# Patient Record
Sex: FEMALE | Race: WHITE | NOT HISPANIC OR LATINO | Employment: OTHER | URBAN - METROPOLITAN AREA
[De-identification: names, ages, dates, MRNs, and addresses within clinical notes are randomized per-mention and may not be internally consistent; named-entity substitution may affect disease eponyms.]

---

## 2017-04-18 DIAGNOSIS — I50.9 CONGESTIVE HEART FAILURE, UNSPECIFIED CONGESTIVE HEART FAILURE CHRONICITY, UNSPECIFIED CONGESTIVE HEART FAILURE TYPE: ICD-10-CM

## 2017-04-18 RX ORDER — CARVEDILOL 12.5 MG/1
12.5 TABLET ORAL 2 TIMES DAILY WITH MEALS
Qty: 180 TAB | Refills: 0 | Status: SHIPPED | OUTPATIENT
Start: 2017-04-18 | End: 2017-06-26 | Stop reason: SDUPTHER

## 2017-04-24 ENCOUNTER — TELEPHONE (OUTPATIENT)
Dept: CARDIOLOGY | Facility: MEDICAL CENTER | Age: 62
End: 2017-04-24

## 2017-04-24 NOTE — TELEPHONE ENCOUNTER
Fax from [facility] reporting 2 active Rxs for carvedilol 12.5 mg.  Pt was last seen in clinic 3/29/16 at which time Dr. Maurice > her Carvedilol to 12.5 mg BID.  She canceled her appointments for 4/12/16, 4/26/16 & 5/20/16.  She has no scheduled follow up appointment.  Apparently she has a prescription from Dr. Marrero for Carvedilol 12.5 mg 0.5 tablet BID on 3/14/17.  Faxed back to [facility] with information we have not seen patient and cannot address Dr. Marrero's Rx's.  Sent to [facility]

## 2017-04-26 NOTE — TELEPHONE ENCOUNTER
Repeat fax request received from [facility] pharmacy.  Faxed what was sent 4/24/17.  As stated previously regarding Carvedilol dose.

## 2017-06-26 DIAGNOSIS — I50.22 CHRONIC SYSTOLIC CHF (CONGESTIVE HEART FAILURE) (HCC): ICD-10-CM

## 2017-06-27 RX ORDER — CARVEDILOL 12.5 MG/1
12.5 TABLET ORAL 2 TIMES DAILY WITH MEALS
Qty: 60 TAB | Refills: 0 | Status: SHIPPED | OUTPATIENT
Start: 2017-06-27 | End: 2021-03-10

## 2017-07-12 ENCOUNTER — HOSPITAL ENCOUNTER (OUTPATIENT)
Dept: RADIOLOGY | Facility: MEDICAL CENTER | Age: 62
End: 2017-07-12
Attending: NURSE PRACTITIONER
Payer: MEDICARE

## 2017-07-12 ENCOUNTER — HOSPITAL ENCOUNTER (EMERGENCY)
Facility: MEDICAL CENTER | Age: 62
End: 2017-07-12
Attending: EMERGENCY MEDICINE
Payer: MEDICARE

## 2017-07-12 VITALS
DIASTOLIC BLOOD PRESSURE: 50 MMHG | SYSTOLIC BLOOD PRESSURE: 78 MMHG | HEIGHT: 66 IN | RESPIRATION RATE: 16 BRPM | WEIGHT: 125.22 LBS | BODY MASS INDEX: 20.12 KG/M2 | HEART RATE: 57 BPM | OXYGEN SATURATION: 97 % | TEMPERATURE: 98.7 F

## 2017-07-12 DIAGNOSIS — S90.31XA CONTUSION OF RIGHT FOOT, INITIAL ENCOUNTER: ICD-10-CM

## 2017-07-12 DIAGNOSIS — M79.671 RIGHT FOOT PAIN: ICD-10-CM

## 2017-07-12 PROCEDURE — 99283 EMERGENCY DEPT VISIT LOW MDM: CPT

## 2017-07-12 PROCEDURE — 304561 HCHG STAT O2

## 2017-07-12 PROCEDURE — A9270 NON-COVERED ITEM OR SERVICE: HCPCS | Performed by: EMERGENCY MEDICINE

## 2017-07-12 PROCEDURE — 700102 HCHG RX REV CODE 250 W/ 637 OVERRIDE(OP): Performed by: EMERGENCY MEDICINE

## 2017-07-12 RX ORDER — IBUPROFEN 600 MG/1
600 TABLET ORAL ONCE
Status: COMPLETED | OUTPATIENT
Start: 2017-07-12 | End: 2017-07-12

## 2017-07-12 RX ORDER — IBUPROFEN 600 MG/1
600 TABLET ORAL EVERY 6 HOURS PRN
Qty: 20 TAB | Refills: 0 | Status: ON HOLD | OUTPATIENT
Start: 2017-07-12 | End: 2023-01-12

## 2017-07-12 RX ADMIN — IBUPROFEN 600 MG: 600 TABLET, FILM COATED ORAL at 12:12

## 2017-07-12 ASSESSMENT — PAIN SCALES - GENERAL: PAINLEVEL_OUTOF10: 9

## 2017-07-12 NOTE — ED AVS SNAPSHOT
7/12/2017    Asya Rollins  [address]  [place] [address]    Dear Asya:    [facility] wants to ensure your discharge home is safe and you or your loved ones have had all of your questions answered regarding your care after you leave the hospital.    Below is a list of resources and contact information should you have any questions regarding your hospital stay, follow-up instructions, or active medical symptoms.    Questions or Concerns Regarding… Contact   Medical Questions Related to Your Discharge  (7 days a week, 8am-5pm) Contact a Nurse Care Coordinator   [phone]   Medical Questions Not Related to Your Discharge  (24 hours a day / 7 days a week)  Contact the Nurse Health Line   [phone]    Medications or Discharge Instructions Refer to your discharge packet   or contact your [facility] Primary Care Provider   [phone]   Follow-up Appointment(s) Schedule your appointment via [employer]   or contact Scheduling [phone]   Billing Review your statement via [employer]  or contact Billing [phone]   Medical Records Review your records via [employer]   or contact Medical Records [phone]     You may receive a telephone call within two days of discharge. This call is to make certain you understand your discharge instructions and have the opportunity to have any questions answered. You can also easily access your medical information, test results and upcoming appointments via the [employer] free online health management tool. You can learn more and sign up at [employer]. For assistance setting up your [employer] account, please call [phone].    Once again, we want to ensure your discharge home is safe and that you have a clear understanding of any next steps in your care. If you have any questions or concerns, please do not hesitate to contact us, we are here for you. Thank you for choosing [facility] for your healthcare needs.    Sincerely,    Your [facility] Healthcare Team

## 2017-07-12 NOTE — ED NOTES
"Attempted to d/c-pt states dr has not \"given me the results\". rn stated foot bone buised but not broke. Pt wishes to speak with erp prior to d/c. erp aware of same  "

## 2017-07-12 NOTE — ED NOTES
Dc instructions given to pt after erp. Prescription to be picked up at pharmacy in [facility]. Pt aware of same. States did not bring oxygen to er, despite wearing oxygen in er. No one able to bring her some, and did not wear it to hospital. encourgaed to NOT go anywhere w/o same. amb with crutches out of er

## 2017-07-12 NOTE — ED AVS SNAPSHOT
Rescale Access Code: [email or web address]  Expires: 7/21/2017  3:08 PM    Rescale  A secure, online tool to manage your health information     [employer]’s Rescale® is a secure, online tool that connects you to your personalized health information from the privacy of your home -- day or night - making it very easy for you to manage your healthcare. Once the activation process is completed, you can even access your medical information using the Rescale domingo, which is available for free in the Apple Domingo store or Google Play store.     Rescale provides the following levels of access (as shown below):   My Chart Features   [facility] Primary Care Doctor [facility]  Specialists [facility]  Urgent  Care Non-[facility]  Primary Care  Doctor   Email your healthcare team securely and privately 24/7 X X X X   Manage appointments: schedule your next appointment; view details of past/upcoming appointments X      Request prescription refills. X      View recent personal medical records, including lab and immunizations X X X X   View health record, including health history, allergies, medications X X X X   Read reports about your outpatient visits, procedures, consult and ER notes X X X X   See your discharge summary, which is a recap of your hospital and/or ER visit that includes your diagnosis, lab results, and care plan. X X       How to register for Rescale:  1. Go to  [email or web address].  2. Click on the Sign Up Now box, which takes you to the New Member Sign Up page. You will need to provide the following information:  a. Enter your Rescale Access Code exactly as it appears at the top of this page. (You will not need to use this code after you’ve completed the sign-up process. If you do not sign up before the expiration date, you must request a new code.)   b. Enter your date of birth.   c. Enter your home email address.   d. Click Submit, and follow the next screen’s instructions.  3. Create a Rescale ID. This will be your Rescale  login ID and cannot be changed, so think of one that is secure and easy to remember.  4. Create a [email or web address] password. You can change your password at any time.  5. Enter your Password Reset Question and Answer. This can be used at a later time if you forget your password.   6. Enter your e-mail address. This allows you to receive e-mail notifications when new information is available in [email or web address].  7. Click Sign Up. You can now view your health information.    For assistance activating your [email or web address] account, call [phone]

## 2017-07-12 NOTE — ED AVS SNAPSHOT
Home Care Instructions                                                                                                                Asya Rollins   MRN: [id number]    Department:  [facility], Emergency Dept   Date of Visit:  7/12/2017            [facility], Emergency Dept    [address]    [place] [address]    Phone:  [phone]      You were seen by     Ash Freeman M.D.      Your Diagnosis Was     Contusion of right foot, initial encounter     S90.31XA       These are the medications you received during your hospitalization from 07/12/2017 1129 to 07/12/2017 1343     Date/Time Order Dose Route Action    07/12/2017 1212 ibuprofen (MOTRIN) tablet 600 mg 600 mg Oral Given      Follow-up Information     1. Follow up with [facility], Emergency Dept.    Specialty:  Emergency Medicine    Why:  If symptoms worsen    Contact information    [address] [address] [phone]        2. Follow up with ROSALIE Barakat. Schedule an appointment as soon as possible for a visit in 1 week.    Specialty:  Family Medicine    Why:  if pain persists    Contact information    [address]  [place] [address] [phone]        Medication Information     Review all of your home medications and newly ordered medications with your primary doctor and/or pharmacist as soon as possible. Follow medication instructions as directed by your doctor and/or pharmacist.     Please keep your complete medication list with you and share with your physician. Update the information when medications are discontinued, doses are changed, or new medications (including over-the-counter products) are added; and carry medication information at all times in the event of emergency situations.               Medication List      START taking these medications        Instructions    Morning Afternoon Evening Bedtime    ibuprofen  600 MG Tabs   Last time this was given:  600 mg on 7/12/2017 12:12 PM   Commonly known as:  MOTRIN        Take 1 Tab by mouth every 6 hours as needed.   Dose:  600 mg                          ASK your doctor about these medications        Instructions    Morning Afternoon Evening Bedtime    ambrisentan 5 MG tablet   Commonly known as:  LETAIRIS        Take 5 mg by mouth every day.   Dose:  5 mg                        Aspirin 325 MG Tbec        Take 1 Tab by mouth every day.   Dose:  325 mg                        atorvastatin 10 MG Tabs   Commonly known as:  LIPITOR        Take 1 Tab by mouth every bedtime.   Dose:  10 mg                        BREO ELLIPTA 100-25 MCG/INH Aepb   Generic drug:  Fluticasone Furoate-Vilanterol        Inhale 1 Puff by mouth every day.   Dose:  1 Puff                        busPIRone 10 MG Tabs   Commonly known as:  BUSPAR        Doctor's comments:  Must keep upcoming appointment   Take 1 Tab by mouth 2 times a day.   Dose:  10 mg                        carvedilol 12.5 MG Tabs   Commonly known as:  COREG        Take 1 Tab by mouth 2 times a day, with meals. Please see your cardiologist for refills   Dose:  12.5 mg                        escitalopram 10 MG Tabs   Commonly known as:  LEXAPRO        Take 1 Tab by mouth every day.   Dose:  10 mg                        fluticasone-salmeterol 250-50 MCG/DOSE Aepb   Commonly known as:  ADVAIR        Inhale 1 Puff by mouth every 12 hours.   Dose:  1 Puff                        furosemide 20 MG Tabs   Commonly known as:  LASIX        Take 1 Tab by mouth 2 times a day.   Dose:  20 mg                        * Levothyroxine Sodium 50 MCG Caps   Commonly known as:  TIROSINT        Doctor's comments:  Must get further refills from PCP   Take 1 Cap by mouth Every morning on an empty stomach.   Dose:  1 Cap                        * levothyroxine 50 MCG Tabs   Commonly known as:  SYNTHROID        Take 1 Tab by mouth Every morning on an empty stomach.      Dose:  50 mcg                        lisinopril 5 MG Tabs   Commonly known as:  PRINIVIL        Take 5 mg by mouth every day.   Dose:  5 mg                        ondansetron 4 MG Tbdp   Commonly known as:  ZOFRAN ODT        Take 1-2 Tabs by mouth every 8 hours as needed for Nausea/Vomiting.   Dose:  4-8 mg                        * pregabalin 50 MG capsule   Commonly known as:  LYRICA        Take 1 Cap by mouth 3 times a day.   Dose:  50 mg                        * pregabalin 100 MG Caps   Commonly known as:  LYRICA        Take 1 Cap by mouth 2 times a day.   Dose:  100 mg                        sildenafil 20 MG tablet   Commonly known as:  REVATIO        Take 20 mg by mouth 3 times a day.   Dose:  20 mg                        spironolactone 25 MG Tabs   Commonly known as:  ALDACTONE        Take 25 mg by mouth. 1/2 tablet daily   Dose:  25 mg                        TADALAFIL PO        Take  by mouth.                        VENTOLIN  (90 BASE) MCG/ACT Aers inhalation aerosol   Generic drug:  albuterol        INHALE TWO PUFFS BY MOUTH EVERY 4 TO 6 HOURS AS NEEDED                        Vitamin D-3 5000 UNITS Tabs        Take  by mouth.                        * Notice:  This list has 4 medication(s) that are the same as other medications prescribed for you. Read the directions carefully, and ask your doctor or other care provider to review them with you.         Where to Get Your Medications      These medications were sent to [facility][id number] - [place], [place] - [address]  [address], [facility] [place] [address]     Phone:  [phone]    - ibuprofen 600 MG Tabs            Procedures and tests performed during your visit     CRUTCHES        Discharge Instructions       Foot Contusion  A foot contusion is a deep bruise to the foot. Contusions are the result of an injury that caused bleeding under the skin. The contusion may turn blue, purple, or yellow. Minor injuries will give you a painless  contusion, but more severe contusions may stay painful and swollen for a few weeks.  CAUSES   A foot contusion comes from a direct blow to that area, such as a heavy object falling on the foot.  SYMPTOMS   · Swelling of the foot.  · Discoloration of the foot.  · Tenderness or soreness of the foot.  DIAGNOSIS   You will have a physical exam and will be asked about your history. You may need an X-ray of your foot to look for a broken bone (fracture).   TREATMENT   An elastic wrap may be recommended to support your foot. Resting, elevating, and applying cold compresses to your foot are often the best treatments for a foot contusion. Over-the-counter medicines may also be recommended for pain control.  HOME CARE INSTRUCTIONS   · Put ice on the injured area.  ¨ Put ice in a plastic bag.  ¨ Place a towel between your skin and the bag.  ¨ Leave the ice on for 15-20 minutes, 03-04 times a day.  · Only take over-the-counter or prescription medicines for pain, discomfort, or fever as directed by your caregiver.  · If told, use an elastic wrap as directed. This can help reduce swelling. You may remove the wrap for sleeping, showering, and bathing. If your toes become numb, cold, or blue, take the wrap off and reapply it more loosely.  · Elevate your foot with pillows to reduce swelling.  · Try to avoid standing or walking while the foot is painful. Do not resume use until instructed by your caregiver. Then, begin use gradually. If pain develops, decrease use. Gradually increase activities that do not cause discomfort until you have normal use of your foot.  · See your caregiver as directed. It is very important to keep all follow-up appointments in order to avoid any lasting problems with your foot, including long-term (chronic) pain.  SEEK IMMEDIATE MEDICAL CARE IF:   · You have increased redness, swelling, or pain in your foot.  · Your swelling or pain is not relieved with medicines.  · You have loss of feeling in your foot  or are unable to move your toes.  · Your foot turns cold or blue.  · You have pain when you move your toes.  · Your foot becomes warm to the touch.  · Your contusion does not improve in 2 days.  MAKE SURE YOU:   · Understand these instructions.  · Will watch your condition.  · Will get help right away if you are not doing well or get worse.     This information is not intended to replace advice given to you by your health care provider. Make sure you discuss any questions you have with your health care provider.     Document Released: 10/09/2007 Document Revised: 06/18/2013 Document Reviewed: 11/20/2012  [employer] Interactive Patient Education ©2016 [employer]            Patient Information     Patient Information    Following emergency treatment: all patient requiring follow-up care must return either to a private physician or a clinic if your condition worsens before you are able to obtain further medical attention, please return to the emergency room.     Billing Information    At [facility], we work to make the billing process streamlined for our patients.  Our Representatives are here to answer any questions you may have regarding your hospital bill.  If you have insurance coverage and have supplied your insurance information to us, we will submit a claim to your insurer on your behalf.  Should you have any questions regarding your bill, we can be reached online or by phone as follows:  Online: You are able pay your bills online or live chat with our representatives about any billing questions you may have. We are here to help Monday - Friday from 8:00am to 7:30pm and 9:00am - 12:00pm on Saturdays.  Please visit [email or web address]  for more information.   Phone:  [phone] or [phone]    Please note that your emergency physician, surgeon, pathologist, radiologist, anesthesiologist, and other specialists are not employed by [facility] and will therefore bill  separately for their services.  Please contact them directly for any questions concerning their bills at the numbers below:     Emergency Physician Services:  [phone]  [facility]:  [phone]  [facility]:  [phone]  [facility]:  [phone]    1. Your final bill may vary from the amount quoted upon discharge if all procedures are not complete at that time, or if your doctor has additional procedures of which we are not aware. You will receive an additional bill if you return to the Emergency Department at [facility] for suture removal regardless of the facility of which the sutures were placed.     2. Please arrange for settlement of this account at the emergency registration.    3. All self-pay accounts are due in full at the time of treatment.  If you are unable to meet this obligation then payment is expected within 4-5 days.     4. If you have had radiology studies (CT, X-ray, Ultrasound, MRI), you have received a preliminary result during your emergency department visit. Please contact the radiology department [phone] to receive a copy of your final result. Please discuss the Final result with your primary physician or with the follow up physician provided.     Crisis Hotline:  [place] Crisis Hotline:  4-035-NILLNUL or 1-580.913.5529  [place] Crisis Hotline:    1-829.874.5781 or 953-563-5814         ED Discharge Follow Up Questions    1. In order to provide you with very good care, we would like to follow up with a phone call in the next few days.  May we have your permission to contact you?     YES /  NO    2. What is the best phone number to call you? (       )_____-__________    3. What is the best time to call you?      Morning  /  Afternoon  /  Evening                   Patient Signature:  ____________________________________________________________    Date:  ____________________________________________________________

## 2017-07-12 NOTE — DISCHARGE INSTRUCTIONS
Foot Contusion  A foot contusion is a deep bruise to the foot. Contusions are the result of an injury that caused bleeding under the skin. The contusion may turn blue, purple, or yellow. Minor injuries will give you a painless contusion, but more severe contusions may stay painful and swollen for a few weeks.  CAUSES   A foot contusion comes from a direct blow to that area, such as a heavy object falling on the foot.  SYMPTOMS   · Swelling of the foot.  · Discoloration of the foot.  · Tenderness or soreness of the foot.  DIAGNOSIS   You will have a physical exam and will be asked about your history. You may need an X-ray of your foot to look for a broken bone (fracture).   TREATMENT   An elastic wrap may be recommended to support your foot. Resting, elevating, and applying cold compresses to your foot are often the best treatments for a foot contusion. Over-the-counter medicines may also be recommended for pain control.  HOME CARE INSTRUCTIONS   · Put ice on the injured area.  ¨ Put ice in a plastic bag.  ¨ Place a towel between your skin and the bag.  ¨ Leave the ice on for 15-20 minutes, 03-04 times a day.  · Only take over-the-counter or prescription medicines for pain, discomfort, or fever as directed by your caregiver.  · If told, use an elastic wrap as directed. This can help reduce swelling. You may remove the wrap for sleeping, showering, and bathing. If your toes become numb, cold, or blue, take the wrap off and reapply it more loosely.  · Elevate your foot with pillows to reduce swelling.  · Try to avoid standing or walking while the foot is painful. Do not resume use until instructed by your caregiver. Then, begin use gradually. If pain develops, decrease use. Gradually increase activities that do not cause discomfort until you have normal use of your foot.  · See your caregiver as directed. It is very important to keep all follow-up appointments in order to avoid any lasting problems with your foot,  including long-term (chronic) pain.  SEEK IMMEDIATE MEDICAL CARE IF:   · You have increased redness, swelling, or pain in your foot.  · Your swelling or pain is not relieved with medicines.  · You have loss of feeling in your foot or are unable to move your toes.  · Your foot turns cold or blue.  · You have pain when you move your toes.  · Your foot becomes warm to the touch.  · Your contusion does not improve in 2 days.  MAKE SURE YOU:   · Understand these instructions.  · Will watch your condition.  · Will get help right away if you are not doing well or get worse.     This information is not intended to replace advice given to you by your health care provider. Make sure you discuss any questions you have with your health care provider.     Document Released: 10/09/2007 Document Revised: 06/18/2013 Document Reviewed: 11/20/2012  [employer] Interactive Patient Education ©2016 [employer]

## 2017-07-12 NOTE — ED PROVIDER NOTES
ED Provider Note    CHIEF COMPLAINT   Chief Complaint   Patient presents with   • Foot Pain     dropped hammer on right foot one week ago         HPI   Asya Rollins is a 62 y.o. female who presents to the emergency department with chief complaint of right foot pain. Proximal line 5 days ago the patient dropped hammer on her foot. She says it was a normal-size hammer that fell directly on the dorsum of the foot. Since then she's had swelling of the foot. She thought that it was getting better but now it seems to be worse. She does not have any pain in the calf or leg. No other injuries. No fevers.    REVIEW OF SYSTEMS   See HPI for further details. All other systems are negative.     PAST MEDICAL HISTORY   Past Medical History   Diagnosis Date   • Hypertension    • Thyroid disease    • Other abnormal glucose    • Other malaise and fatigue    • Unspecified asthma    • Pneumonia    • Congestive heart failure (CMS-HCC)      from drugs and alcohol   • ETOH abuse    • Methamphetamine use      last 7 mos ago   • Chronic obstruct airways disease (CMS-HCC)        FAMILY HISTORY  Family History   Problem Relation Age of Onset   • Diabetes Mother    • Hypertension Mother    • Heart Disease Mother    • Thyroid Mother    • Heart Disease Father    • Diabetes Other    • Diabetes Other    • Hypertension Other    • Heart Disease Other        SOCIAL HISTORY  Social History     Social History   • Marital Status:      Spouse Name: N/A   • Number of Children: N/A   • Years of Education: N/A     Social History Main Topics   • Smoking status: Never Smoker    • Smokeless tobacco: Never Used   • Alcohol Use: Yes      Comment: daily 12 pack beerdaily   • Drug Use: Yes     Special: Marijuana      Comment: previously   • Sexual Activity: Not Asked     Other Topics Concern   • None     Social History Narrative       SURGICAL HISTORY  Past Surgical History   Procedure Laterality Date   • Other       bilat arm fx surgeries   • Gyn  "surgery       tubal ligation       CURRENT MEDICATIONS   Home Medications     **Home medications have not yet been reviewed for this encounter**          ALLERGIES   Allergies   Allergen Reactions   • Morphine Rash     Pt felt like body was on fire    • Avelox [Moxifloxacin Hcl In Nacl] Hives, Rash and Swelling       PHYSICAL EXAM  VITAL SIGNS: BP 84/50 mmHg  Pulse 56  Temp(Src) 36.9 °C (98.4 °F)  Resp 20  Ht 1.676 m (5' 6\")  Wt 56.8 kg (125 lb 3.5 oz)  BMI 20.22 kg/m2  Constitutional: Well developed, Well nourished, No acute distress, Non-toxic appearance.   Cardiovascular: The foot is warm. Normal perfusion. Strong dorsalis pedis pulse.  Skin: Warm, Dry, No erythema, No rash.   Extremities: Intact distal pulses, No cyanosis, No clubbing.   Musculoskeletal: Tenderness to palpation over the dorsum of the right foot primarily over the midfoot. No significant ecchymosis.  Neurologic: Alert & oriented x 3, Normal motor function, Normal sensory function, No focal deficits noted.     RADIOLOGY/PROCEDURES  Question capsular avulsion fracture at the navicular cuneiform joint. More likely this is secondary to dystrophic calcification from remote soft tissue injury. MRI could clarify if clinically indicated    COURSE & MEDICAL DECISION MAKING  Pertinent Labs & Imaging studies reviewed. (See chart for details)    The patient presents today after dropping a hammer on her foot. X-ray does not demonstrate any definite fracture. There is question of a capsular avulsion fracture. However an avulsion fracture would not be consistent with the patient's mechanism. This was from dropping a hammer directly on the foot which would not result in a avulsion type fracture. Therefore I do not feel that further imaging is indicated. I'll treat the patient with rice therapy. She is given crutches, she is to minimize weightbearing, rest, ice, elevation. Motrin for pain. She is discharged home in stable condition.    FINAL IMPRESSION  1. " Contusion of right foot, initial encounter            Electronically signed by: Ash Freeman, 7/12/2017 1:10 PM

## 2017-11-01 ENCOUNTER — APPOINTMENT (OUTPATIENT)
Dept: RADIOLOGY | Facility: MEDICAL CENTER | Age: 62
End: 2017-11-01
Attending: EMERGENCY MEDICINE
Payer: MEDICARE

## 2017-11-01 ENCOUNTER — HOSPITAL ENCOUNTER (EMERGENCY)
Facility: MEDICAL CENTER | Age: 62
End: 2017-11-01
Attending: EMERGENCY MEDICINE
Payer: MEDICARE

## 2017-11-01 VITALS
BODY MASS INDEX: 20.02 KG/M2 | RESPIRATION RATE: 18 BRPM | SYSTOLIC BLOOD PRESSURE: 118 MMHG | TEMPERATURE: 99.3 F | HEART RATE: 89 BPM | HEIGHT: 66 IN | DIASTOLIC BLOOD PRESSURE: 62 MMHG | OXYGEN SATURATION: 99 % | WEIGHT: 124.56 LBS

## 2017-11-01 DIAGNOSIS — M79.672 LEFT FOOT PAIN: ICD-10-CM

## 2017-11-01 DIAGNOSIS — L03.116 CELLULITIS OF LEFT LOWER EXTREMITY: ICD-10-CM

## 2017-11-01 LAB
ALBUMIN SERPL BCP-MCNC: 3.9 G/DL (ref 3.2–4.9)
ALBUMIN/GLOB SERPL: 1.1 G/DL
ALP SERPL-CCNC: 61 U/L (ref 30–99)
ALT SERPL-CCNC: 19 U/L (ref 2–50)
ANION GAP SERPL CALC-SCNC: 10 MMOL/L (ref 0–11.9)
AST SERPL-CCNC: 24 U/L (ref 12–45)
BASOPHILS # BLD AUTO: 0.4 % (ref 0–1.8)
BASOPHILS # BLD: 0.04 K/UL (ref 0–0.12)
BILIRUB SERPL-MCNC: 1.1 MG/DL (ref 0.1–1.5)
BNP SERPL-MCNC: 34 PG/ML (ref 0–100)
BUN SERPL-MCNC: 15 MG/DL (ref 8–22)
CALCIUM SERPL-MCNC: 9.1 MG/DL (ref 8.4–10.2)
CHLORIDE SERPL-SCNC: 98 MMOL/L (ref 96–112)
CO2 SERPL-SCNC: 26 MMOL/L (ref 20–33)
CREAT SERPL-MCNC: 0.78 MG/DL (ref 0.5–1.4)
EKG IMPRESSION: NORMAL
EOSINOPHIL # BLD AUTO: 0.18 K/UL (ref 0–0.51)
EOSINOPHIL NFR BLD: 2 % (ref 0–6.9)
ERYTHROCYTE [DISTWIDTH] IN BLOOD BY AUTOMATED COUNT: 48.8 FL (ref 35.9–50)
GFR SERPL CREATININE-BSD FRML MDRD: >60 ML/MIN/1.73 M 2
GLOBULIN SER CALC-MCNC: 3.5 G/DL (ref 1.9–3.5)
GLUCOSE SERPL-MCNC: 93 MG/DL (ref 65–99)
HCT VFR BLD AUTO: 40.3 % (ref 37–47)
HGB BLD-MCNC: 13.6 G/DL (ref 12–16)
IMM GRANULOCYTES # BLD AUTO: 0.03 K/UL (ref 0–0.11)
IMM GRANULOCYTES NFR BLD AUTO: 0.3 % (ref 0–0.9)
LYMPHOCYTES # BLD AUTO: 0.85 K/UL (ref 1–4.8)
LYMPHOCYTES NFR BLD: 9.4 % (ref 22–41)
MCH RBC QN AUTO: 30.9 PG (ref 27–33)
MCHC RBC AUTO-ENTMCNC: 33.7 G/DL (ref 33.6–35)
MCV RBC AUTO: 91.6 FL (ref 81.4–97.8)
MONOCYTES # BLD AUTO: 0.91 K/UL (ref 0–0.85)
MONOCYTES NFR BLD AUTO: 10.1 % (ref 0–13.4)
NEUTROPHILS # BLD AUTO: 7.01 K/UL (ref 2–7.15)
NEUTROPHILS NFR BLD: 77.8 % (ref 44–72)
NRBC # BLD AUTO: 0 K/UL
NRBC BLD AUTO-RTO: 0 /100 WBC
PLATELET # BLD AUTO: 224 K/UL (ref 164–446)
PMV BLD AUTO: 10.3 FL (ref 9–12.9)
POTASSIUM SERPL-SCNC: 3.4 MMOL/L (ref 3.6–5.5)
PROT SERPL-MCNC: 7.4 G/DL (ref 6–8.2)
RBC # BLD AUTO: 4.4 M/UL (ref 4.2–5.4)
SODIUM SERPL-SCNC: 134 MMOL/L (ref 135–145)
TROPONIN I SERPL-MCNC: <0.02 NG/ML (ref 0–0.04)
URATE SERPL-MCNC: 6.9 MG/DL (ref 1.9–8.2)
WBC # BLD AUTO: 9 K/UL (ref 4.8–10.8)

## 2017-11-01 PROCEDURE — 36415 COLL VENOUS BLD VENIPUNCTURE: CPT

## 2017-11-01 PROCEDURE — 85025 COMPLETE CBC W/AUTO DIFF WBC: CPT

## 2017-11-01 PROCEDURE — 84550 ASSAY OF BLOOD/URIC ACID: CPT

## 2017-11-01 PROCEDURE — 99285 EMERGENCY DEPT VISIT HI MDM: CPT

## 2017-11-01 PROCEDURE — A9270 NON-COVERED ITEM OR SERVICE: HCPCS | Performed by: EMERGENCY MEDICINE

## 2017-11-01 PROCEDURE — 700102 HCHG RX REV CODE 250 W/ 637 OVERRIDE(OP): Performed by: EMERGENCY MEDICINE

## 2017-11-01 PROCEDURE — 93005 ELECTROCARDIOGRAM TRACING: CPT

## 2017-11-01 PROCEDURE — 304561 HCHG STAT O2

## 2017-11-01 PROCEDURE — 93005 ELECTROCARDIOGRAM TRACING: CPT | Performed by: EMERGENCY MEDICINE

## 2017-11-01 PROCEDURE — 700111 HCHG RX REV CODE 636 W/ 250 OVERRIDE (IP): Performed by: EMERGENCY MEDICINE

## 2017-11-01 PROCEDURE — 84484 ASSAY OF TROPONIN QUANT: CPT

## 2017-11-01 PROCEDURE — 96374 THER/PROPH/DIAG INJ IV PUSH: CPT

## 2017-11-01 PROCEDURE — 80053 COMPREHEN METABOLIC PANEL: CPT

## 2017-11-01 PROCEDURE — 96375 TX/PRO/DX INJ NEW DRUG ADDON: CPT

## 2017-11-01 PROCEDURE — 83880 ASSAY OF NATRIURETIC PEPTIDE: CPT

## 2017-11-01 PROCEDURE — 73630 X-RAY EXAM OF FOOT: CPT | Mod: LT

## 2017-11-01 PROCEDURE — 73630 X-RAY EXAM OF FOOT: CPT | Mod: RT

## 2017-11-01 PROCEDURE — 94760 N-INVAS EAR/PLS OXIMETRY 1: CPT

## 2017-11-01 PROCEDURE — 71010 DX-CHEST-PORTABLE (1 VIEW): CPT

## 2017-11-01 RX ORDER — SPIRONOLACTONE 25 MG/1
25 TABLET ORAL DAILY
COMMUNITY

## 2017-11-01 RX ORDER — FUROSEMIDE 20 MG/1
20 TABLET ORAL
Status: ON HOLD | COMMUNITY
End: 2023-01-12

## 2017-11-01 RX ORDER — HYDROCODONE BITARTRATE AND ACETAMINOPHEN 5; 325 MG/1; MG/1
1-2 TABLET ORAL EVERY 6 HOURS PRN
Qty: 10 TAB | Refills: 0 | Status: SHIPPED | OUTPATIENT
Start: 2017-11-01 | End: 2019-02-18

## 2017-11-01 RX ORDER — CEPHALEXIN 250 MG/1
500 CAPSULE ORAL ONCE
Status: COMPLETED | OUTPATIENT
Start: 2017-11-01 | End: 2017-11-01

## 2017-11-01 RX ORDER — ONDANSETRON 2 MG/ML
4 INJECTION INTRAMUSCULAR; INTRAVENOUS ONCE
Status: COMPLETED | OUTPATIENT
Start: 2017-11-01 | End: 2017-11-01

## 2017-11-01 RX ORDER — BUSPIRONE HYDROCHLORIDE 10 MG/1
10 TABLET ORAL PRN
Status: SHIPPED | COMMUNITY
End: 2018-01-03 | Stop reason: SDUPTHER

## 2017-11-01 RX ORDER — CEPHALEXIN 500 MG/1
500 CAPSULE ORAL 4 TIMES DAILY
Qty: 28 CAP | Refills: 0 | Status: SHIPPED | OUTPATIENT
Start: 2017-11-01 | End: 2017-11-08

## 2017-11-01 RX ORDER — SULFAMETHOXAZOLE AND TRIMETHOPRIM 800; 160 MG/1; MG/1
1 TABLET ORAL 2 TIMES DAILY
Qty: 14 TAB | Refills: 0 | Status: SHIPPED | OUTPATIENT
Start: 2017-11-01 | End: 2017-11-08

## 2017-11-01 RX ORDER — SULFAMETHOXAZOLE AND TRIMETHOPRIM 800; 160 MG/1; MG/1
1 TABLET ORAL ONCE
Status: COMPLETED | OUTPATIENT
Start: 2017-11-01 | End: 2017-11-01

## 2017-11-01 RX ADMIN — ONDANSETRON 4 MG: 2 INJECTION, SOLUTION INTRAMUSCULAR; INTRAVENOUS at 16:38

## 2017-11-01 RX ADMIN — SULFAMETHOXAZOLE AND TRIMETHOPRIM 1 TABLET: 800; 160 TABLET ORAL at 17:58

## 2017-11-01 RX ADMIN — FENTANYL CITRATE 50 MCG: 50 INJECTION INTRAMUSCULAR; INTRAVENOUS at 16:38

## 2017-11-01 RX ADMIN — CEPHALEXIN 500 MG: 250 CAPSULE ORAL at 17:58

## 2017-11-01 ASSESSMENT — PAIN SCALES - GENERAL
PAINLEVEL_OUTOF10: 0
PAINLEVEL_OUTOF10: 9

## 2017-11-01 NOTE — ED PROVIDER NOTES
ED Provider Note    CHIEF COMPLAINT   Chief Complaint   Patient presents with   • Foot Swelling     left greater than right       HPI   Asya Rollins is a 62 y.o. female who presents to the ED secondary to bilateral foot swelling left greater than right. The patient has a history of COPD, CHF, she took her Lasix today. She started developing left foot pain and swelling over the last several days, worse this morning. Patient denies any chest pains, does have some slight shortness of breath, no cough. Has been using her breathing treatments. The patient does not have any history of gout but has a family history of gout. No history of blood clots to the lungs or legs.    REVIEW OF SYSTEMS   See HPI for further details. All other systems are negative.     PAST MEDICAL HISTORY   Past Medical History:   Diagnosis Date   • Chronic obstruct airways disease (CMS-HCC)    • Congestive heart failure (CMS-[facility])     from drugs and alcohol   • ETOH abuse    • Hypertension    • Methamphetamine use     last 7 mos ago   • Other abnormal glucose    • Other malaise and fatigue    • Pneumonia    • Thyroid disease    • Unspecified asthma(493.90)        FAMILY HISTORY  Family History   Problem Relation Age of Onset   • Diabetes Mother    • Hypertension Mother    • Heart Disease Mother    • Thyroid Mother    • Heart Disease Father    • Diabetes Other    • Diabetes Other    • Hypertension Other    • Heart Disease Other        SOCIAL HISTORY  Social History     Social History   • Marital status:      Spouse name: N/A   • Number of children: N/A   • Years of education: N/A     Social History Main Topics   • Smoking status: Never Smoker   • Smokeless tobacco: Never Used   • Alcohol use Yes      Comment: daily 12 pack beerdaily   • Drug use:      Types: Marijuana, Inhaled      Comment: methamphetamine   • Sexual activity: Not on file     Other Topics Concern   • Not on file     Social History Narrative   • No narrative on file  "      SURGICAL HISTORY  Past Surgical History:   Procedure Laterality Date   • GYN SURGERY      tubal ligation   • OTHER      bilat arm fx surgeries       CURRENT MEDICATIONS   Home Medications     Reviewed by Yogesh Otero (Pharmacy Tech) on 11/01/17 at 1556  Med List Status: Complete   Medication Last Dose Status   ambrisentan (LETAIRIS) 5 MG tablet Unknown Active   aspirin  MG TBEC 11/1/2017 Active   atorvastatin (LIPITOR) 10 MG Tab Unknown Active   busPIRone (BUSPAR) 10 MG Tab > 2 days Active   carvedilol (COREG) 12.5 MG Tab Unknown Active   Cholecalciferol (VITAMIN D-3) 5000 UNITS Tab 11/1/2017 Active   escitalopram (LEXAPRO) 10 MG Tab Unknown Active   Fluticasone Furoate-Vilanterol (BREO ELLIPTA) 100-25 MCG/INH AEPB 11/1/2017 Active   furosemide (LASIX) 20 MG Tab 11/1/2017 Active   ibuprofen (MOTRIN) 600 MG Tab 11/1/2017 Active   levothyroxine (SYNTHROID) 50 MCG Tab Unknown Active   pregabalin (LYRICA) 50 MG capsule Unknown Active   sildenafil (REVATIO) 20 MG tablet Unknown Active   spironolactone (ALDACTONE) 25 MG Tab 11/1/2017 Active   VENTOLIN  (90 BASE) MCG/ACT Aero Soln inhalation aerosol > 2 days Active                ALLERGIES   Allergies   Allergen Reactions   • Morphine Rash     Pt felt like body was on fire    • Avelox [Moxifloxacin Hcl In Nacl] Hives, Rash and Swelling       PHYSICAL EXAM  VITAL SIGNS: /62   Pulse 89   Temp 37.4 °C (99.3 °F)   Resp 18   Ht 1.676 m (5' 6\")   Wt 56.5 kg (124 lb 9 oz)   SpO2 99%   BMI 20.10 kg/m²   Constitutional: Well developed, Well nourished,Moderate distress, Non-toxic appearance.   HENT:  Atraumatic, Normocephalic, Oral pharynx with moist mucous membranes.   Eyes: EOMI, PERRL  Cardiovascular: Good Pulses, regular and rhythm  Thorax & Lungs: No respiratory distress decreased breath sounds throughout, slight crackles at bilateral bases  Abdomen: Soft nontender   Skin: Warm, slight erythema throughout the left foot mainly focused on " the left 1st MTP, with soft tissue swelling, slight erythema tracking up the foot and around the arch of the left foot. There is slight tender to palpation and erythema and swelling on the base of the 5th metatarsal, 2+ pulses, normal sensation distally.  Musculoskeletal: As per above increased redness pain swelling of the left 1st MTP  Neurologic: Alert & oriented x 3, 2+ pulses, normal sensation    RADIOLOGY/PROCEDURES  DX-FOOT-COMPLETE 3+ LEFT   Final Result      No radiographic evidence of acute displaced fracture or subluxation.      Diffuse soft tissue swelling      DX-FOOT-COMPLETE 3+ RIGHT   Final Result      No radiographic evidence of acute bony destruction, displaced fracture or subluxation.      Diffuse soft tissue swelling      DX-CHEST-PORTABLE (1 VIEW)   Final Result      No acute cardiac or pulmonary abnormalities are identified.            COURSE & MEDICAL DECISION MAKING  Pertinent Labs & Imaging studies reviewed. (See chart for details)  Patient comes in surgery to lower extremity swelling, seems more inflammatory to me possibly arthritis versus gout versus cellulitis, left greater than right. I will make sure the patient's BNP and chest x-ray look appropriate, check basic laboratory tests, we'll give the patient fentanyl for pain.    BNP is normal, chest x-ray is normal, I believe the patient's foot pain and swelling is likely from arthritis however could possibly be cellulitis to follow up with the patient on Keflex, Bactrim, but the patient on crutches, have the patient follow up with orthopedics, have the patient return with worsening symptoms.    I reviewed prescription monitoring program for patient's narcotic use before prescribing a scheduled drug.The patient will not drink alcohol nor drive with prescribed medications.} The patient will return for new or worsening symptoms and is stable at the time of discharge.    The patient is referred to a primary physician for blood pressure  management, diabetic screening, and for all other preventative health concerns.    DISPOSITION:  Patient will be discharged home in stable condition.    FOLLOW UP:  [facility], Emergency Dept  [address]  [place] [address] [phone]    If symptoms worsen    ROSALIE Jules  [address]  [place] [address] [phone]            OUTPATIENT MEDICATIONS:  Discharge Medication List as of 11/1/2017  5:46 PM      START taking these medications    Details   cephALEXin (KEFLEX) 500 MG Cap Take 1 Cap by mouth 4 times a day for 7 days., Disp-28 Cap, R-0, Normal      sulfamethoxazole-trimethoprim (BACTRIM DS) 800-160 MG tablet Take 1 Tab by mouth 2 times a day for 7 days., Disp-14 Tab, R-0, Normal      hydrocodone-acetaminophen (NORCO) 5-325 MG Tab per tablet Take 1-2 Tabs by mouth every 6 hours as needed., Disp-10 Tab, R-0, Print Rx Paper               FINAL IMPRESSION  1. Left foot pain    2. Cellulitis of left lower extremity        Patient referred to primary care provider for blood pressure management     This dictation was created using voice recognition software. The accuracy of the dictation is limited to the abilities of the software. I expect there may be some errors of grammar and possibly content. The nursing notes were reviewed and certain aspects of this information were incorporated into this note.    Electronically signed by: Grant Saldaña, 11/1/2017 4:25 PM

## 2017-11-01 NOTE — ED NOTES
Pt increasingly SOB with increasing of bilateral foot swelling x 4 days. PMH COPD, uses 4 L o2 at all times,  Currently 98% on 4L.

## 2017-11-01 NOTE — ED NOTES
"Med rec updated and complete  Allergies reviewed  Pt had a list of medications, went over list of medications and returned list back to pts sister.  Pt states \"I can't remember all of my medications that I took today (11/1/2017), just some of them\".  Pt states \"I took ASPIRIN 325MG 4 tablets today 11/1/2017 @ 1200\".  \"I also took my morning dose too @ 0930\".  Nurse was informed.    "

## 2017-11-01 NOTE — ED NOTES
Pt took a total of 1,625 mg of ASA today to treat foot pain. ERP made aware. Pt informed ASA should not be taken for pain control.

## 2017-11-01 NOTE — ED NOTES
Patient brought into ED via wheelchair with c/o bilat foot swelling, left greater than right. Patient reports history of CHF, ekg completed in triage, referred to ED by cardiology.

## 2017-11-02 ENCOUNTER — PATIENT OUTREACH (OUTPATIENT)
Dept: HEALTH INFORMATION MANAGEMENT | Facility: OTHER | Age: 62
End: 2017-11-02

## 2017-11-02 NOTE — ED NOTES
Pt reports her sister took her crutches home, requesting another pair. ERP aware, placed verbal order.

## 2019-02-20 PROBLEM — G89.29 CHRONIC PERIPHERAL NEUROPATHIC PAIN: Status: ACTIVE | Noted: 2019-02-20

## 2019-02-20 PROBLEM — M79.2 CHRONIC PERIPHERAL NEUROPATHIC PAIN: Status: ACTIVE | Noted: 2019-02-20

## 2020-02-29 ENCOUNTER — HOSPITAL ENCOUNTER (OUTPATIENT)
Facility: MEDICAL CENTER | Age: 65
End: 2020-02-29
Payer: MEDICARE

## 2020-02-29 ENCOUNTER — HOSPITAL ENCOUNTER (OUTPATIENT)
Dept: HOSPITAL 8 - 5SO | Age: 65
Setting detail: OBSERVATION
LOS: 1 days | Discharge: HOME | End: 2020-03-01
Attending: HOSPITALIST | Admitting: INTERNAL MEDICINE
Payer: MEDICARE

## 2020-02-29 VITALS — BODY MASS INDEX: 29.97 KG/M2 | HEIGHT: 65 IN | WEIGHT: 179.9 LBS

## 2020-02-29 VITALS — BODY MASS INDEX: 20.09 KG/M2 | HEIGHT: 66 IN | WEIGHT: 125 LBS

## 2020-02-29 VITALS — DIASTOLIC BLOOD PRESSURE: 73 MMHG | SYSTOLIC BLOOD PRESSURE: 118 MMHG

## 2020-02-29 DIAGNOSIS — F32.9: ICD-10-CM

## 2020-02-29 DIAGNOSIS — F41.9: ICD-10-CM

## 2020-02-29 DIAGNOSIS — J44.9: ICD-10-CM

## 2020-02-29 DIAGNOSIS — Z88.8: ICD-10-CM

## 2020-02-29 DIAGNOSIS — I27.20: ICD-10-CM

## 2020-02-29 DIAGNOSIS — J96.20: Primary | ICD-10-CM

## 2020-02-29 DIAGNOSIS — Z87.891: ICD-10-CM

## 2020-02-29 DIAGNOSIS — E03.9: ICD-10-CM

## 2020-02-29 DIAGNOSIS — Z99.81: ICD-10-CM

## 2020-02-29 DIAGNOSIS — F15.10: ICD-10-CM

## 2020-02-29 DIAGNOSIS — E78.5: ICD-10-CM

## 2020-02-29 DIAGNOSIS — Z88.5: ICD-10-CM

## 2020-02-29 DIAGNOSIS — Z98.51: ICD-10-CM

## 2020-02-29 DIAGNOSIS — I11.0: ICD-10-CM

## 2020-02-29 DIAGNOSIS — Z79.899: ICD-10-CM

## 2020-02-29 DIAGNOSIS — I50.42: ICD-10-CM

## 2020-02-29 DIAGNOSIS — M10.9: ICD-10-CM

## 2020-02-29 PROCEDURE — 83880 ASSAY OF NATRIURETIC PEPTIDE: CPT

## 2020-02-29 PROCEDURE — 99285 EMERGENCY DEPT VISIT HI MDM: CPT

## 2020-02-29 PROCEDURE — 71045 X-RAY EXAM CHEST 1 VIEW: CPT

## 2020-02-29 PROCEDURE — 80053 COMPREHEN METABOLIC PANEL: CPT

## 2020-02-29 PROCEDURE — 84484 ASSAY OF TROPONIN QUANT: CPT

## 2020-02-29 PROCEDURE — 94640 AIRWAY INHALATION TREATMENT: CPT

## 2020-02-29 PROCEDURE — 84443 ASSAY THYROID STIM HORMONE: CPT

## 2020-02-29 PROCEDURE — 85025 COMPLETE CBC W/AUTO DIFF WBC: CPT

## 2020-02-29 PROCEDURE — G0378 HOSPITAL OBSERVATION PER HR: HCPCS

## 2020-02-29 PROCEDURE — 96372 THER/PROPH/DIAG INJ SC/IM: CPT

## 2020-02-29 PROCEDURE — 93306 TTE W/DOPPLER COMPLETE: CPT

## 2020-02-29 PROCEDURE — 36415 COLL VENOUS BLD VENIPUNCTURE: CPT

## 2020-02-29 ASSESSMENT — FIBROSIS 4 INDEX: FIB4 SCORE: 1.33

## 2020-03-01 VITALS — SYSTOLIC BLOOD PRESSURE: 103 MMHG | DIASTOLIC BLOOD PRESSURE: 67 MMHG

## 2020-03-01 VITALS — SYSTOLIC BLOOD PRESSURE: 98 MMHG | DIASTOLIC BLOOD PRESSURE: 62 MMHG

## 2020-03-01 VITALS — DIASTOLIC BLOOD PRESSURE: 70 MMHG | SYSTOLIC BLOOD PRESSURE: 111 MMHG

## 2020-03-01 LAB
ALBUMIN SERPL-MCNC: 3.1 G/DL (ref 3.4–5)
ALP SERPL-CCNC: 80 U/L (ref 45–117)
ALT SERPL-CCNC: 18 U/L (ref 12–78)
ANION GAP SERPL CALC-SCNC: 9 MMOL/L (ref 5–15)
BASOPHILS # BLD AUTO: 0.03 X10^3/UL (ref 0–0.1)
BASOPHILS NFR BLD AUTO: 1 % (ref 0–1)
BILIRUB SERPL-MCNC: 1.7 MG/DL (ref 0.2–1)
CALCIUM SERPL-MCNC: 8.7 MG/DL (ref 8.5–10.1)
CHLORIDE SERPL-SCNC: 110 MMOL/L (ref 98–107)
CREAT SERPL-MCNC: 0.87 MG/DL (ref 0.55–1.02)
EOSINOPHIL # BLD AUTO: 0.29 X10^3/UL (ref 0–0.4)
EOSINOPHIL NFR BLD AUTO: 5 % (ref 1–7)
ERYTHROCYTE [DISTWIDTH] IN BLOOD BY AUTOMATED COUNT: 16.2 % (ref 9.6–15.2)
LYMPHOCYTES # BLD AUTO: 1.66 X10^3/UL (ref 1–3.4)
LYMPHOCYTES NFR BLD AUTO: 31 % (ref 22–44)
MCH RBC QN AUTO: 29.8 PG (ref 27–34.8)
MCHC RBC AUTO-ENTMCNC: 32.1 G/DL (ref 32.4–35.8)
MCV RBC AUTO: 92.6 FL (ref 80–100)
MD: NO
MONOCYTES # BLD AUTO: 0.44 X10^3/UL (ref 0.2–0.8)
MONOCYTES NFR BLD AUTO: 8 % (ref 2–9)
NEUTROPHILS # BLD AUTO: 2.92 X10^3/UL (ref 1.8–6.8)
NEUTROPHILS NFR BLD AUTO: 55 % (ref 42–75)
PLATELET # BLD AUTO: 270 X10^3/UL (ref 130–400)
PMV BLD AUTO: 9.2 FL (ref 7.4–10.4)
PROT SERPL-MCNC: 6.3 G/DL (ref 6.4–8.2)
RBC # BLD AUTO: 4.52 X10^6/UL (ref 3.82–5.3)
TROPONIN I SERPL-MCNC: 0.02 NG/ML (ref 0–0.04)
TROPONIN I SERPL-MCNC: 0.02 NG/ML (ref 0–0.04)

## 2020-03-01 RX ADMIN — HEPARIN SODIUM SCH UNITS: 5000 INJECTION, SOLUTION INTRAVENOUS; SUBCUTANEOUS at 04:19

## 2020-03-01 RX ADMIN — HEPARIN SODIUM SCH UNITS: 5000 INJECTION, SOLUTION INTRAVENOUS; SUBCUTANEOUS at 08:21

## 2023-01-02 PROBLEM — B33.8 RSV INFECTION: Status: ACTIVE | Noted: 2023-01-02

## 2023-01-02 PROBLEM — R17 TOTAL BILIRUBIN, ELEVATED: Status: ACTIVE | Noted: 2023-01-02

## 2023-01-02 PROBLEM — Z71.89 ADVANCE CARE PLANNING: Status: ACTIVE | Noted: 2023-01-02

## 2023-01-02 PROBLEM — J96.21 ACUTE ON CHRONIC RESPIRATORY FAILURE WITH HYPOXIA (HCC): Status: ACTIVE | Noted: 2023-01-02

## 2023-01-03 PROBLEM — Z78.9 ALCOHOL USE: Status: ACTIVE | Noted: 2023-01-03

## 2023-01-11 PROBLEM — D72.829 LEUKOCYTOSIS: Status: ACTIVE | Noted: 2023-01-11

## 2023-01-11 PROBLEM — R73.9 STEROID-INDUCED HYPERGLYCEMIA: Status: ACTIVE | Noted: 2023-01-11

## 2023-01-11 PROBLEM — T38.0X5A STEROID-INDUCED HYPERGLYCEMIA: Status: ACTIVE | Noted: 2023-01-11

## 2023-06-02 PROBLEM — J44.9 CHRONIC OBSTRUCTIVE PULMONARY DISEASE (HCC): Status: ACTIVE | Noted: 2021-03-23

## 2023-06-02 PROBLEM — I50.9 CONGESTIVE HEART FAILURE (HCC): Status: ACTIVE | Noted: 2023-06-02

## 2023-06-02 PROBLEM — Z00.00 ENCOUNTER FOR ANNUAL PHYSICAL EXAM: Status: ACTIVE | Noted: 2023-06-02

## 2023-06-02 PROBLEM — M79.2 PERIPHERAL NEUROPATHIC PAIN: Status: ACTIVE | Noted: 2023-06-02

## 2023-06-02 PROBLEM — F41.8 DEPRESSION WITH ANXIETY: Status: ACTIVE | Noted: 2023-06-02

## 2023-06-02 PROBLEM — J96.11 CHRONIC RESPIRATORY FAILURE WITH HYPOXIA (HCC): Status: ACTIVE | Noted: 2021-03-23

## 2023-06-02 PROBLEM — Z76.89 ENCOUNTER TO ESTABLISH CARE: Status: ACTIVE | Noted: 2023-06-02

## 2023-06-02 PROBLEM — R76.8 HEPATITIS C ANTIBODY POSITIVE IN BLOOD: Status: ACTIVE | Noted: 2023-06-02

## 2023-06-02 PROBLEM — E78.5 DYSLIPIDEMIA: Status: ACTIVE | Noted: 2023-06-02

## 2023-06-02 PROBLEM — M62.830 SPASM OF LUMBAR PARASPINOUS MUSCLE: Status: ACTIVE | Noted: 2023-06-02

## 2023-06-02 PROBLEM — E11.9 DIABETES MELLITUS, NEW ONSET (HCC): Status: ACTIVE | Noted: 2023-06-02

## 2023-06-04 PROBLEM — E55.9 VITAMIN D INSUFFICIENCY: Status: ACTIVE | Noted: 2023-06-04

## 2023-06-04 PROBLEM — G25.0 ESSENTIAL TREMOR: Status: ACTIVE | Noted: 2023-06-04

## 2023-07-26 PROBLEM — J06.9 ACUTE UPPER RESPIRATORY INFECTION, UNSPECIFIED: Status: ACTIVE | Noted: 2023-07-26
